# Patient Record
Sex: FEMALE | Race: WHITE | NOT HISPANIC OR LATINO | Employment: UNEMPLOYED | URBAN - METROPOLITAN AREA
[De-identification: names, ages, dates, MRNs, and addresses within clinical notes are randomized per-mention and may not be internally consistent; named-entity substitution may affect disease eponyms.]

---
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
## 2025-01-28 ENCOUNTER — TELEPHONE (OUTPATIENT)
Dept: INTERNAL MEDICINE | Facility: CLINIC | Age: 20
End: 2025-01-28
Payer: COMMERCIAL

## 2025-01-28 NOTE — TELEPHONE ENCOUNTER
Caller: Julia Momin    Relationship: Self    Best call back number:     What form or medical record are you requesting: LAB RESULTS FROM OLD PHYSICIAN  AND RECENT URINALYSIS FROM DR CAMPBELL    Who is requesting this form or medical record from you: PSYCHIATRIST AT [facility] FOR MEDIATION REVIEW      Timeframe paperwork needed: ASAP    Additional notes: PATIENT WILL CALL BACK WITH FAX NUMBER

## 2025-01-29 NOTE — TELEPHONE ENCOUNTER
Spoke to patient, and notified labs from 1/29/24 we cannot fax due to alverto outside facility labs done in [place]. Patient advised to contact where she obtain labs to have faxed. Told patient I can fax over Urine from Dr. Benson on 8/9/24.    Please fax urine 8/9/24 to provided fax number.

## 2025-05-22 ENCOUNTER — TELEPHONE (OUTPATIENT)
Dept: INTERNAL MEDICINE | Facility: CLINIC | Age: 20
End: 2025-05-22
Payer: COMMERCIAL

## 2025-05-22 DIAGNOSIS — Z51.81 MEDICATION MONITORING ENCOUNTER: Primary | ICD-10-CM

## 2025-05-22 NOTE — TELEPHONE ENCOUNTER
PATIENT NEEDS TO HAVE EKG FOR HER PSYCHIATRIST WHO WANTS TO CHANGE HER ADHD MEDS . CAN YOU PUT ORDER IN AND I CAN SCHEDULE FOR NURSE VISIT?

## 2025-05-28 ENCOUNTER — CLINICAL SUPPORT (OUTPATIENT)
Dept: INTERNAL MEDICINE | Facility: CLINIC | Age: 20
End: 2025-05-28
Payer: COMMERCIAL

## 2025-05-28 DIAGNOSIS — F90.9 ATTENTION DEFICIT HYPERACTIVITY DISORDER (ADHD), UNSPECIFIED ADHD TYPE: Primary | ICD-10-CM

## 2025-05-28 PROCEDURE — 93000 ELECTROCARDIOGRAM COMPLETE: CPT | Performed by: STUDENT IN AN ORGANIZED HEALTH CARE EDUCATION/TRAINING PROGRAM

## 2025-06-09 NOTE — PROGRESS NOTES
Patient presented for EKG at her request of patient's psychiatrist for medication monitoring.      ECG 12 Lead    Date/Time: 5/28/2025 3:42 PM  Performed by: Cristhian Benson MD    Authorized by: Cristhian Benson MD  Previous ECG: no previous ECG available  Rhythm: sinus tachycardia  Rate: tachycardic  Conduction: conduction normal  Q wave noted on lead: None.    ST Segments: ST segments normal  T inversion: V1 and V2  T flattening: V3  QRS axis: normal  Other findings: non-specific ST-T wave changes    Clinical impression: normal ECG and non-specific ECG        Cristhian Benson MD